# Patient Record
Sex: MALE | Race: WHITE | NOT HISPANIC OR LATINO | Employment: UNEMPLOYED | URBAN - METROPOLITAN AREA
[De-identification: names, ages, dates, MRNs, and addresses within clinical notes are randomized per-mention and may not be internally consistent; named-entity substitution may affect disease eponyms.]

---

## 2019-04-22 ENCOUNTER — APPOINTMENT (OUTPATIENT)
Dept: RADIOLOGY | Facility: CLINIC | Age: 39
End: 2019-04-22
Payer: COMMERCIAL

## 2019-04-22 ENCOUNTER — OFFICE VISIT (OUTPATIENT)
Dept: URGENT CARE | Facility: CLINIC | Age: 39
End: 2019-04-22
Payer: COMMERCIAL

## 2019-04-22 ENCOUNTER — TELEPHONE (OUTPATIENT)
Dept: URGENT CARE | Facility: CLINIC | Age: 39
End: 2019-04-22

## 2019-04-22 VITALS
SYSTOLIC BLOOD PRESSURE: 132 MMHG | BODY MASS INDEX: 36.16 KG/M2 | OXYGEN SATURATION: 96 % | TEMPERATURE: 98 F | WEIGHT: 225 LBS | RESPIRATION RATE: 20 BRPM | HEIGHT: 66 IN | DIASTOLIC BLOOD PRESSURE: 70 MMHG | HEART RATE: 92 BPM

## 2019-04-22 DIAGNOSIS — S63.91XA HAND SPRAIN, RIGHT, INITIAL ENCOUNTER: Primary | ICD-10-CM

## 2019-04-22 DIAGNOSIS — S69.91XA INJURY OF RIGHT HAND, INITIAL ENCOUNTER: ICD-10-CM

## 2019-04-22 PROCEDURE — 73130 X-RAY EXAM OF HAND: CPT

## 2019-04-22 PROCEDURE — G0382 LEV 3 HOSP TYPE B ED VISIT: HCPCS | Performed by: NURSE PRACTITIONER

## 2019-04-24 ENCOUNTER — OFFICE VISIT (OUTPATIENT)
Dept: OBGYN CLINIC | Facility: CLINIC | Age: 39
End: 2019-04-24
Payer: COMMERCIAL

## 2019-04-24 VITALS
SYSTOLIC BLOOD PRESSURE: 163 MMHG | DIASTOLIC BLOOD PRESSURE: 118 MMHG | WEIGHT: 225 LBS | HEIGHT: 66 IN | HEART RATE: 93 BPM | BODY MASS INDEX: 36.16 KG/M2

## 2019-04-24 DIAGNOSIS — S63.91XA HAND SPRAIN, RIGHT, INITIAL ENCOUNTER: ICD-10-CM

## 2019-04-24 DIAGNOSIS — S69.91XA INJURY OF RIGHT HAND, INITIAL ENCOUNTER: ICD-10-CM

## 2019-04-24 PROCEDURE — 99203 OFFICE O/P NEW LOW 30 MIN: CPT | Performed by: ORTHOPAEDIC SURGERY

## 2020-05-14 ENCOUNTER — APPOINTMENT (OUTPATIENT)
Dept: RADIOLOGY | Facility: CLINIC | Age: 40
End: 2020-05-14
Payer: COMMERCIAL

## 2020-05-14 ENCOUNTER — OFFICE VISIT (OUTPATIENT)
Dept: URGENT CARE | Facility: CLINIC | Age: 40
End: 2020-05-14
Payer: COMMERCIAL

## 2020-05-14 VITALS
OXYGEN SATURATION: 100 % | DIASTOLIC BLOOD PRESSURE: 81 MMHG | HEIGHT: 66 IN | HEART RATE: 97 BPM | TEMPERATURE: 97.6 F | WEIGHT: 180 LBS | RESPIRATION RATE: 18 BRPM | BODY MASS INDEX: 28.93 KG/M2 | SYSTOLIC BLOOD PRESSURE: 147 MMHG
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
## 2020-05-14 DIAGNOSIS — S20.211A CONTUSION OF RIB ON RIGHT SIDE, INITIAL ENCOUNTER: Primary | ICD-10-CM

## 2020-05-14 DIAGNOSIS — S93.601A SPRAIN OF RIGHT FOOT, INITIAL ENCOUNTER: ICD-10-CM

## 2020-05-14 DIAGNOSIS — S93.401A SPRAIN OF RIGHT ANKLE, UNSPECIFIED LIGAMENT, INITIAL ENCOUNTER: ICD-10-CM

## 2020-05-14 PROCEDURE — G0382 LEV 3 HOSP TYPE B ED VISIT: HCPCS | Performed by: PHYSICIAN ASSISTANT

## 2020-05-14 PROCEDURE — 71101 X-RAY EXAM UNILAT RIBS/CHEST: CPT

## 2020-05-14 PROCEDURE — 73630 X-RAY EXAM OF FOOT: CPT

## 2020-05-14 PROCEDURE — 96372 THER/PROPH/DIAG INJ SC/IM: CPT | Performed by: PHYSICIAN ASSISTANT

## 2020-05-14 PROCEDURE — 73610 X-RAY EXAM OF ANKLE: CPT

## 2020-05-14 RX ORDER — KETOROLAC TROMETHAMINE 30 MG/ML
30 INJECTION, SOLUTION INTRAMUSCULAR; INTRAVENOUS ONCE
Status: COMPLETED | OUTPATIENT
Start: 2020-05-14 | End: 2020-05-14

## 2020-05-14 RX ADMIN — KETOROLAC TROMETHAMINE 30 MG: 30 INJECTION, SOLUTION INTRAMUSCULAR; INTRAVENOUS at 14:46

## 2020-05-18 ENCOUNTER — OFFICE VISIT (OUTPATIENT)
Dept: OBGYN CLINIC | Facility: CLINIC | Age: 40
End: 2020-05-18
Payer: COMMERCIAL

## 2020-05-18 VITALS
DIASTOLIC BLOOD PRESSURE: 72 MMHG | HEIGHT: 66 IN | SYSTOLIC BLOOD PRESSURE: 118 MMHG | TEMPERATURE: 97.8 F | BODY MASS INDEX: 29.05 KG/M2

## 2020-05-18 DIAGNOSIS — S93.601A SPRAIN OF RIGHT FOOT, INITIAL ENCOUNTER: ICD-10-CM

## 2020-05-18 DIAGNOSIS — S93.401A SPRAIN OF RIGHT ANKLE, UNSPECIFIED LIGAMENT, INITIAL ENCOUNTER: Primary | ICD-10-CM
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
## 2020-05-18 PROCEDURE — 99213 OFFICE O/P EST LOW 20 MIN: CPT | Performed by: ORTHOPAEDIC SURGERY

## 2020-05-18 RX ORDER — IBUPROFEN 600 MG/1
TABLET ORAL EVERY 6 HOURS PRN
COMMUNITY

## 2020-05-26 ENCOUNTER — TELEPHONE (OUTPATIENT)
Dept: OBGYN CLINIC | Facility: MEDICAL CENTER | Age: 40
End: 2020-05-26

## 2020-06-18 ENCOUNTER — OFFICE VISIT (OUTPATIENT)
Dept: OBGYN CLINIC | Facility: CLINIC | Age: 40
End: 2020-06-18
Payer: COMMERCIAL

## 2020-06-18 VITALS
DIASTOLIC BLOOD PRESSURE: 82 MMHG | WEIGHT: 180 LBS | TEMPERATURE: 95.1 F | BODY MASS INDEX: 29.05 KG/M2 | SYSTOLIC BLOOD PRESSURE: 142 MMHG

## 2020-06-18 DIAGNOSIS — S93.401A SPRAIN OF RIGHT ANKLE, UNSPECIFIED LIGAMENT, INITIAL ENCOUNTER: Primary | ICD-10-CM

## 2020-06-18 PROCEDURE — 99213 OFFICE O/P EST LOW 20 MIN: CPT | Performed by: ORTHOPAEDIC SURGERY

## 2020-07-01 ENCOUNTER — TELEPHONE (OUTPATIENT)
Dept: OBGYN CLINIC | Facility: HOSPITAL | Age: 40
End: 2020-07-01

## 2020-07-01 NOTE — TELEPHONE ENCOUNTER
I called patient in regards to his FMLA forms  I could not leave a message because his mail box was full  We need an FMLA auth for the patient in order to complete his FMLA forms

## 2020-07-06 NOTE — TELEPHONE ENCOUNTER
I attempted to call patient again in regards to his FMLA forms      I could not leave a message because his mail box was full      We need an FMLA auth for the patient in order to complete his FMLA forms      If he calls back please let him know we need this in order to complete his disability forms

## 2020-07-15 NOTE — TELEPHONE ENCOUNTER
Left message for patient stating I need our FMLA Auth filled out before I can fax his FMLA Forms  I stated I can fax this or email this form to him      I gave him my number to call me back as soon as possible

## 2021-02-05 ENCOUNTER — OFFICE VISIT (OUTPATIENT)
Dept: URGENT CARE | Facility: CLINIC | Age: 41
End: 2021-02-05

## 2021-02-05 VITALS
DIASTOLIC BLOOD PRESSURE: 103 MMHG | HEIGHT: 67 IN | BODY MASS INDEX: 31.39 KG/M2 | WEIGHT: 200 LBS | TEMPERATURE: 98.5 F | RESPIRATION RATE: 18 BRPM | OXYGEN SATURATION: 99 % | HEART RATE: 106 BPM | SYSTOLIC BLOOD PRESSURE: 168 MMHG

## 2021-02-05 DIAGNOSIS — H10.33 ACUTE BACTERIAL CONJUNCTIVITIS OF BOTH EYES: Primary | ICD-10-CM

## 2021-02-05 PROCEDURE — 99213 OFFICE O/P EST LOW 20 MIN: CPT | Performed by: PHYSICIAN ASSISTANT

## 2021-02-05 RX ORDER — TOBRAMYCIN 3 MG/ML
2 SOLUTION/ DROPS OPHTHALMIC 4 TIMES DAILY
Qty: 1 BOTTLE | Refills: 0 | Status: SHIPPED | OUTPATIENT
Start: 2021-02-05 | End: 2021-02-10

## 2021-02-05 NOTE — PROGRESS NOTES
[facility]    NAME: Simi Coles is a 38 y o  male  : 1980    MRN: [id number]  DATE: 2021  TIME: 6:30 PM    Assessment and Plan   Acute bacterial conjunctivitis of both eyes [H10 33]  1  Acute bacterial conjunctivitis of both eyes  tobramycin (TOBREX) 0 3 % SOLN       Patient Instructions     Patient Instructions   Drops as directed  Wash hands frequently to prevent the spread of infection  Wash towels and sheets in hot water after one use  Chief Complaint     Chief Complaint   Patient presents with    Conjunctivitis     1 WEEK       History of Present Illness     16-year-old male here with complaint of reddened eyes for the last week  States initially thought that he had something in his 1 eye and had been rubbing it  Started discharge from the 1 eye and now has discharge and redness in both of his  Denies any visual changes  Denies any upper respiratory complaints or cold symptoms  Review of Systems   Review of Systems   Constitutional: Negative for chills, fatigue and fever  HENT: Negative for congestion, ear pain, postnasal drip, sinus pressure and sore throat  Eyes: Positive for discharge and redness  Respiratory: Negative for cough, shortness of breath and wheezing  Neurological: Negative for headaches  All other systems reviewed and are negative        Current Medications     Current Outpatient Medications:     ibuprofen (MOTRIN) 600 mg tablet, Take by mouth every 6 (six) hours as needed for mild pain, Disp: , Rfl:     tobramycin (TOBREX) 0 3 % SOLN, Administer 2 drops to both eyes 4 (four) times a day for 5 days, Disp: 1 Bottle, Rfl: 0    Current Allergies     Allergies as of 2021    (No Known Allergies)          The following portions of the patient's history were reviewed and updated as appropriate: allergies, current medications, past family history, past medical history, past social history, past surgical history and problem list  History reviewed  No pertinent past medical history  History reviewed  No pertinent surgical history  History reviewed  No pertinent family history  Social History     Socioeconomic History    Marital status: Single     Spouse name: Not on file    Number of children: Not on file    Years of education: Not on file    Highest education level: Not on file   Occupational History    Not on file   Social Needs    Financial resource strain: Not on file    Food insecurity     Worry: Not on file     Inability: Not on file    Transportation needs     Medical: Not on file     Non-medical: Not on file   Tobacco Use    Smoking status: Current Every Day Smoker    Smokeless tobacco: Never Used   Substance and Sexual Activity    Alcohol use: Not on file    Drug use: Not on file    Sexual activity: Not on file   Lifestyle    Physical activity     Days per week: Not on file     Minutes per session: Not on file    Stress: Not on file   Relationships    Social connections     Talks on phone: Not on file     Gets together: Not on file     Attends Restorationist service: Not on file     Active member of club or organization: Not on file     Attends meetings of clubs or organizations: Not on file     Relationship status: Not on file    Intimate partner violence     Fear of current or ex partner: Not on file     Emotionally abused: Not on file     Physically abused: Not on file     Forced sexual activity: Not on file   Other Topics Concern    Not on file   Social History Narrative    Not on file     Medications have been verified  Objective   BP (!) 168/103   Pulse (!) 106   Temp 98 5 °F (36 9 °C)   Resp 18   Ht 5' 7" (1 702 m)   Wt 90 7 kg (200 lb)   SpO2 99%   BMI 31 32 kg/m²      Physical Exam   Physical Exam  Vitals signs and nursing note reviewed  Constitutional:       General: He is not in acute distress  Appearance: He is well-developed  HENT:      Head: Normocephalic and atraumatic        Right Ear: Tympanic membrane normal       Left Ear: Tympanic membrane normal       Nose: Nose normal  No mucosal edema  Mouth/Throat:      Pharynx: No posterior oropharyngeal erythema  Eyes:      General:         Right eye: Discharge present  Left eye: Discharge present  Conjunctiva/sclera:      Right eye: Right conjunctiva is injected  Left eye: Left conjunctiva is injected  Neck:      Musculoskeletal: Normal range of motion  Cardiovascular:      Rate and Rhythm: Normal rate and regular rhythm  Heart sounds: Normal heart sounds  Pulmonary:      Effort: Pulmonary effort is normal  No respiratory distress  Breath sounds: Normal breath sounds

## 2021-02-05 NOTE — PATIENT INSTRUCTIONS
Drops as directed  Wash hands frequently to prevent the spread of infection  Wash towels and sheets in hot water after one use

## 2022-01-25 ENCOUNTER — OFFICE VISIT (OUTPATIENT)
Dept: URGENT CARE | Facility: CLINIC | Age: 42
End: 2022-01-25

## 2022-01-25 VITALS
TEMPERATURE: 98 F | HEART RATE: 97 BPM | RESPIRATION RATE: 18 BRPM | OXYGEN SATURATION: 98 % | WEIGHT: 200 LBS | BODY MASS INDEX: 31.32 KG/M2

## 2022-01-25 DIAGNOSIS — Z20.822 CLOSE EXPOSURE TO COVID-19 VIRUS: Primary | ICD-10-CM

## 2022-01-25 DIAGNOSIS — R05.9 COUGH: ICD-10-CM

## 2022-01-25 PROCEDURE — 99213 OFFICE O/P EST LOW 20 MIN: CPT | Performed by: PHYSICIAN ASSISTANT

## 2022-01-25 PROCEDURE — 87636 SARSCOV2 & INF A&B AMP PRB: CPT | Performed by: PHYSICIAN ASSISTANT

## 2022-01-25 NOTE — PROGRESS NOTES
[facility]        NAME: Kailey Jo is a 42 y o  male  : 1980    MRN: [id number]  DATE: 2022  TIME: 4:49 PM    Assessment and Plan   Close exposure to COVID-19 virus [Z20 822]  1  Close exposure to COVID-19 virus  Covid/Flu-Office Collect   2  Cough           Patient Instructions   Patient Instructions   If You Test Positive for COVID-19 (Isolate)  Everyone, regardless of vaccination status  Stay home for 5 days  If you have no symptoms or your symptoms are resolving after 5 days, you can leave your house  Continue to wear a mask around others for 5 additional days  If you have a fever, continue to stay home until your fever resolves  If You Were Exposed to Someone with COVID-19 (Quarantine)  If you:    Have been boosted  OR  Completed the primary series of Pfizer or Moderna vaccine within the last 6 months  OR  Completed the primary series of J&J vaccine within the last 2 months    Wear a mask around others for 10 days  Test on day 5, if possible  If you develop symptoms get a test and stay home  If you:    Completed the primary series of Pfizer or Moderna vaccine over 6 months ago and are not boosted  OR  Completed the primary series of J&J over 2 months ago and are not boosted  OR  Are unvaccinated    Stay home for 5 days  After that continue to wear a mask around others for 5 additional days  If you cant quarantine you must wear a mask for 10 days  Test on day 5 if possible  If you develop symptoms get a test and stay home        Follow up with PCP in 3-5 days  Proceed to  ER if symptoms worsen      Chief Complaint     Chief Complaint   Patient presents with    Flu Symptoms     x 1 day         History of Present Illness       -The patient c/o fatigue, ST, non-productive cough x 1 day  -He also c/o feeling feverish  -He states girlfriend tested positive for covid 2 days ago  -He is not vaccinated against covid      Review of Systems   Review of Systems Constitutional: Positive for chills and fever  HENT: Positive for congestion and sore throat  Negative for ear pain, rhinorrhea and sinus pressure  Eyes: Negative for pain and visual disturbance  Respiratory: Positive for cough  Negative for shortness of breath and wheezing  Cardiovascular: Negative for chest pain and palpitations  Gastrointestinal: Negative for abdominal pain, diarrhea and vomiting  Genitourinary: Negative for dysuria and hematuria  Musculoskeletal: Positive for myalgias  Negative for arthralgias and back pain  Skin: Negative for color change and rash  Neurological: Positive for dizziness and headaches  Negative for seizures and syncope  All other systems reviewed and are negative  Current Medications       Current Outpatient Medications:     ibuprofen (MOTRIN) 600 mg tablet, Take by mouth every 6 (six) hours as needed for mild pain (Patient not taking: Reported on 1/25/2022 ), Disp: , Rfl:     Current Allergies     Allergies as of 01/25/2022    (No Known Allergies)            The following portions of the patient's history were reviewed and updated as appropriate: allergies, current medications, past family history, past medical history, past social history, past surgical history and problem list      History reviewed  No pertinent past medical history  History reviewed  No pertinent surgical history  History reviewed  No pertinent family history  Medications have been verified  Objective   Pulse 97   Temp 98 °F (36 7 °C)   Resp 18   Wt 90 7 kg (200 lb)   SpO2 98%   BMI 31 32 kg/m²        Physical Exam     Physical Exam  Constitutional:       Appearance: He is well-developed  HENT:      Head: Normocephalic  Right Ear: Tympanic membrane normal       Left Ear: Tympanic membrane normal       Nose: Congestion and rhinorrhea present  Mouth/Throat:      Pharynx: Posterior oropharyngeal erythema present     Eyes:      General: Left eye: No discharge  Pupils: Pupils are equal, round, and reactive to light  Neck:      Thyroid: No thyromegaly  Trachea: No tracheal deviation  Cardiovascular:      Rate and Rhythm: Normal rate and regular rhythm  Heart sounds: No murmur heard  Pulmonary:      Effort: Pulmonary effort is normal  No respiratory distress  Breath sounds: Rhonchi present  No wheezing or rales  Chest:      Chest wall: No tenderness  Abdominal:      General: Bowel sounds are normal  There is no distension  Palpations: Abdomen is soft  There is no mass  Tenderness: There is no abdominal tenderness  There is no guarding or rebound  Musculoskeletal:         General: Normal range of motion  Cervical back: Normal range of motion  Lymphadenopathy:      Cervical: Cervical adenopathy present  Right cervical: Superficial cervical adenopathy present  Left cervical: Superficial cervical adenopathy present  Skin:     General: Skin is warm  Neurological:      Mental Status: He is alert  -COVID swab was sent to the lab  The patient was instructed to self isolate as per CDC guidelines  He was instructed to go to the ER if symptoms worsen

## 2022-01-26 LAB
FLUAV RNA RESP QL NAA+PROBE: NEGATIVE
FLUBV RNA RESP QL NAA+PROBE: NEGATIVE
SARS-COV-2 RNA RESP QL NAA+PROBE: NEGATIVE

## 2022-02-05 ENCOUNTER — HOSPITAL ENCOUNTER (EMERGENCY)
Facility: HOSPITAL | Age: 42
Discharge: HOME | End: 2022-02-06
Attending: EMERGENCY MEDICINE
Payer: OTHER GOVERNMENT

## 2022-02-05 VITALS
HEART RATE: 96 BPM | SYSTOLIC BLOOD PRESSURE: 166 MMHG | WEIGHT: 196.21 LBS | TEMPERATURE: 97.7 F | OXYGEN SATURATION: 99 % | BODY MASS INDEX: 30.73 KG/M2 | RESPIRATION RATE: 16 BRPM | DIASTOLIC BLOOD PRESSURE: 90 MMHG

## 2022-02-05 DIAGNOSIS — S49.91XA INJURY OF RIGHT SHOULDER, INITIAL ENCOUNTER: Primary | ICD-10-CM

## 2022-02-05 PROCEDURE — 99283 EMERGENCY DEPT VISIT LOW MDM: CPT

## 2022-02-06 ENCOUNTER — APPOINTMENT (EMERGENCY)
Dept: RADIOLOGY | Facility: HOSPITAL | Age: 42
End: 2022-02-06

## 2022-02-06 PROCEDURE — 99284 EMERGENCY DEPT VISIT MOD MDM: CPT | Performed by: EMERGENCY MEDICINE

## 2022-02-06 PROCEDURE — 73030 X-RAY EXAM OF SHOULDER: CPT

## 2022-02-06 NOTE — ED PROVIDER NOTES
History  Chief Complaint   Patient presents with    Shoulder Injury     Patient is a 41-year-old male presents for evaluation of right shoulder injury  Patient says he slipped on the ice earlier tonight landing on his right shoulder  He did not hit his head or lose consciousness  He is not on any blood thinners  The patient is able to move his right shoulder those it causes him some discomfort  He said initially he had some paresthesias in the right arm but they have improved  No obvious deformity on exam          Prior to Admission Medications   Prescriptions Last Dose Informant Patient Reported? Taking?   ibuprofen (MOTRIN) 600 mg tablet  Self Yes No   Sig: Take by mouth every 6 (six) hours as needed for mild pain   Patient not taking: Reported on 1/25/2022       Facility-Administered Medications: None       No past medical history on file  No past surgical history on file  No family history on file  I have reviewed and agree with the history as documented  E-Cigarette/Vaping    E-Cigarette Use Never User      E-Cigarette/Vaping Substances     Social History     Tobacco Use    Smoking status: Current Every Day Smoker     Packs/day: 0 50    Smokeless tobacco: Never Used   Vaping Use    Vaping Use: Never used   Substance Use Topics    Alcohol use: Never    Drug use: Never       Review of Systems   Constitutional: Negative for chills, fever and unexpected weight change  HENT: Negative for congestion, sore throat and trouble swallowing  Eyes: Negative for pain, discharge and itching  Respiratory: Negative for cough, chest tightness, shortness of breath and wheezing  Cardiovascular: Negative for chest pain, palpitations and leg swelling  Gastrointestinal: Negative for abdominal pain, blood in stool, diarrhea, nausea and vomiting  Endocrine: Negative for polyuria  Genitourinary: Negative for difficulty urinating, dysuria, frequency and hematuria     Musculoskeletal: Positive for arthralgias (R shoulder)  Negative for back pain and joint swelling  Neurological: Negative for dizziness, syncope, weakness, light-headedness and headaches  Physical Exam  Physical Exam  Vitals and nursing note reviewed  Constitutional:       General: He is not in acute distress  Appearance: He is well-developed  HENT:      Head: Normocephalic and atraumatic  Right Ear: External ear normal       Left Ear: External ear normal    Eyes:      Conjunctiva/sclera: Conjunctivae normal       Pupils: Pupils are equal, round, and reactive to light  Cardiovascular:      Rate and Rhythm: Normal rate and regular rhythm  Heart sounds: Normal heart sounds  No murmur heard  No friction rub  No gallop  Pulmonary:      Effort: Pulmonary effort is normal  No respiratory distress  Breath sounds: Normal breath sounds  No wheezing or rales  Abdominal:      General: Bowel sounds are normal  There is no distension  Palpations: Abdomen is soft  Tenderness: There is no abdominal tenderness  There is no guarding  Musculoskeletal:         General: Tenderness (R shoulder) and signs of injury present  No swelling or deformity  Normal range of motion  Cervical back: Normal range of motion  Lymphadenopathy:      Cervical: No cervical adenopathy  Skin:     General: Skin is warm and dry  Neurological:      General: No focal deficit present  Mental Status: He is alert and oriented to person, place, and time  Mental status is at baseline  Cranial Nerves: No cranial nerve deficit  Sensory: No sensory deficit  Motor: No weakness or abnormal muscle tone     Psychiatric:         Behavior: Behavior normal          Vital Signs  ED Triage Vitals [02/05/22 2350]   Temperature Pulse Respirations Blood Pressure SpO2   97 7 °F (36 5 °C) 96 16 166/90 99 %      Temp src Heart Rate Source Patient Position - Orthostatic VS BP Location FiO2 (%)   -- -- -- -- --      Pain Score       6 Vitals:    02/05/22 2350   BP: 166/90   Pulse: 96         Visual Acuity      ED Medications  Medications - No data to display    Diagnostic Studies  Results Reviewed     None                 XR shoulder 2+ views RIGHT   ED Interpretation by Roxanne Watkins DO (02/06 0038)   No acute osseous abnormality                  Procedures  Procedures         ED Course  ED Course as of 02/06/22 0057   Sun Feb 06, 2022   9269 Patient with point tenderness in the area where there is questionable abnormality on the right shoulder x-ray  I told the patient that the treatment for potential fracture was shoulder immobilization and ortho follow-up which I am going to do  SBIRT 22yo+      Most Recent Value   SBIRT (24 yo +)    In order to provide better care to our patients, we are screening all of our patients for alcohol and drug use  Would it be okay to ask you these screening questions? Yes Filed at: 02/06/2022 0015   Initial Alcohol Screen: US AUDIT-C     1  How often do you have a drink containing alcohol? 0  [Simultaneous filing  User may not have seen previous data ] Filed at: 02/06/2022 0015   2  How many drinks containing alcohol do you have on a typical day you are drinking?  0  [Simultaneous filing  User may not have seen previous data ] Filed at: 02/06/2022 0015   3a  Male UNDER 65: How often do you have five or more drinks on one occasion? 0  [Simultaneous filing  User may not have seen previous data ] Filed at: 02/06/2022 0015   3b  FEMALE Any Age, or MALE 65+: How often do you have 4 or more drinks on one occassion? 0  [Simultaneous filing  User may not have seen previous data ] Filed at: 02/06/2022 0015   Audit-C Score 0  [Simultaneous filing  User may not have seen previous data ] Filed at: 02/06/2022 0015   HETAL: How many times in the past year have you    Used an illegal drug or used a prescription medication for non-medical reasons? Never  [Simultaneous filing   User may not have seen previous data ] Filed at: 02/06/2022 0015                    [facility]  Number of Diagnoses or Management Options  Diagnosis management comments: 43-year-old male presenting for right shoulder pain  Fell on the [place]  Denies hit head or lose conscious  No obvious deformity to the right shoulder  Does have some tenderness on exam   Arm is neurovascularly intact  Will obtain x-ray of right shoulder  Patient declined pain management in the ED      Disposition  Final diagnoses:   Injury of right shoulder, initial encounter     Time reflects when diagnosis was documented in both MDM as applicable and the Disposition within this note     Time User Action Codes Description Comment    2/6/2022 12:39 AM Jensen Lorenzo Add [S49 [id number]] Injury of right shoulder, initial encounter       ED Disposition     ED Disposition Condition Date/Time Comment    Discharge Stable Sun Feb 6, 2022 12:39 AM Hira Gill discharge to home/self care              Follow-up Information     Follow up With Specialties Details Why Contact Info Additional Information    [address] [facility] card Orthopedic Surgery Schedule an appointment as soon as possible for a visit  For follow up of R shoulder pain [address] [facility] card, [address] [place] [address]          Discharge Medication List as of 2/6/2022 12:40 AM      CONTINUE these medications which have NOT CHANGED    Details   ibuprofen (MOTRIN) 600 mg tablet Take by mouth every 6 (six) hours as needed for mild pain, Historical Med                 PDMP Review     None          ED Provider  Electronically Signed by           Cassius Mills DO  02/06/22 6523

## 2022-04-15 ENCOUNTER — HOSPITAL ENCOUNTER (EMERGENCY)
Facility: HOSPITAL | Age: 42
Discharge: HOME | End: 2022-04-16
Attending: EMERGENCY MEDICINE
Payer: OTHER GOVERNMENT

## 2022-04-15 ENCOUNTER — APPOINTMENT (EMERGENCY)
Dept: RADIOLOGY | Facility: HOSPITAL | Age: 42
End: 2022-04-15
Payer: OTHER GOVERNMENT

## 2022-04-15 DIAGNOSIS — R11.2 NAUSEA AND VOMITING: ICD-10-CM

## 2022-04-15 DIAGNOSIS — R07.89 ATYPICAL CHEST PAIN: Primary | ICD-10-CM

## 2022-04-15 LAB
ALBUMIN SERPL BCP-MCNC: 4.1 G/DL (ref 3.5–5)
ALP SERPL-CCNC: 109 U/L (ref 46–116)
ALT SERPL W P-5'-P-CCNC: 40 U/L (ref 12–78)
ANION GAP SERPL CALCULATED.3IONS-SCNC: 7 MMOL/L (ref 4–13)
APTT PPP: 29 SECONDS (ref 23–37)
AST SERPL W P-5'-P-CCNC: 13 U/L (ref 5–45)
BASOPHILS # BLD AUTO: 0.1 THOUSANDS/ΜL (ref 0–0.1)
BASOPHILS NFR BLD AUTO: 1 % (ref 0–1)
BILIRUB SERPL-MCNC: 0.33 MG/DL (ref 0.2–1)
BUN SERPL-MCNC: 16 MG/DL (ref 5–25)
CALCIUM SERPL-MCNC: 9.2 MG/DL (ref 8.3–10.1)
CARDIAC TROPONIN I PNL SERPL HS: 3 NG/L
CHLORIDE SERPL-SCNC: 103 MMOL/L (ref 100–108)
CO2 SERPL-SCNC: 27 MMOL/L (ref 21–32)
CREAT SERPL-MCNC: 1.1 MG/DL (ref 0.6–1.3)
EOSINOPHIL # BLD AUTO: 0.33 THOUSAND/ΜL (ref 0–0.61)
EOSINOPHIL NFR BLD AUTO: 4 % (ref 0–6)
ERYTHROCYTE [DISTWIDTH] IN BLOOD BY AUTOMATED COUNT: 11.5 % (ref 11.6–15.1)
GFR SERPL CREATININE-BSD FRML MDRD: 82 ML/MIN/1.73SQ M
GLUCOSE SERPL-MCNC: 90 MG/DL (ref 65–140)
HCT VFR BLD AUTO: 46.1 % (ref 36.5–49.3)
HGB BLD-MCNC: 16.2 G/DL (ref 12–17)
IMM GRANULOCYTES # BLD AUTO: 0.04 THOUSAND/UL (ref 0–0.2)
IMM GRANULOCYTES NFR BLD AUTO: 0 % (ref 0–2)
INR PPP: 0.95 (ref 0.84–1.19)
LIPASE SERPL-CCNC: 145 U/L (ref 73–393)
LYMPHOCYTES # BLD AUTO: 3.16 THOUSANDS/ΜL (ref 0.6–4.47)
LYMPHOCYTES NFR BLD AUTO: 34 % (ref 14–44)
MCH RBC QN AUTO: 30.7 PG (ref 26.8–34.3)
MCHC RBC AUTO-ENTMCNC: 35.1 G/DL (ref 31.4–37.4)
MCV RBC AUTO: 87 FL (ref 82–98)
MONOCYTES # BLD AUTO: 0.93 THOUSAND/ΜL (ref 0.17–1.22)
MONOCYTES NFR BLD AUTO: 10 % (ref 4–12)
NEUTROPHILS # BLD AUTO: 4.65 THOUSANDS/ΜL (ref 1.85–7.62)
NEUTS SEG NFR BLD AUTO: 51 % (ref 43–75)
NRBC BLD AUTO-RTO: 0 /100 WBCS
PLATELET # BLD AUTO: 308 THOUSANDS/UL (ref 149–390)
PMV BLD AUTO: 9 FL (ref 8.9–12.7)
POTASSIUM SERPL-SCNC: 4.5 MMOL/L (ref 3.5–5.3)
PROT SERPL-MCNC: 7.9 G/DL (ref 6.4–8.2)
PROTHROMBIN TIME: 12.3 SECONDS (ref 11.6–14.5)
RBC # BLD AUTO: 5.28 MILLION/UL (ref 3.88–5.62)
SODIUM SERPL-SCNC: 137 MMOL/L (ref 136–145)
WBC # BLD AUTO: 9.21 THOUSAND/UL (ref 4.31–10.16)

## 2022-04-15 PROCEDURE — 85730 THROMBOPLASTIN TIME PARTIAL: CPT | Performed by: EMERGENCY MEDICINE

## 2022-04-15 PROCEDURE — 71045 X-RAY EXAM CHEST 1 VIEW: CPT

## 2022-04-15 PROCEDURE — 85025 COMPLETE CBC W/AUTO DIFF WBC: CPT | Performed by: EMERGENCY MEDICINE

## 2022-04-15 PROCEDURE — 80053 COMPREHEN METABOLIC PANEL: CPT | Performed by: EMERGENCY MEDICINE

## 2022-04-15 PROCEDURE — 36415 COLL VENOUS BLD VENIPUNCTURE: CPT | Performed by: EMERGENCY MEDICINE

## 2022-04-15 PROCEDURE — 85610 PROTHROMBIN TIME: CPT | Performed by: EMERGENCY MEDICINE

## 2022-04-15 PROCEDURE — 93005 ELECTROCARDIOGRAM TRACING: CPT

## 2022-04-15 PROCEDURE — 99285 EMERGENCY DEPT VISIT HI MDM: CPT | Performed by: EMERGENCY MEDICINE

## 2022-04-15 PROCEDURE — 96374 THER/PROPH/DIAG INJ IV PUSH: CPT

## 2022-04-15 PROCEDURE — 99285 EMERGENCY DEPT VISIT HI MDM: CPT

## 2022-04-15 PROCEDURE — 84484 ASSAY OF TROPONIN QUANT: CPT | Performed by: EMERGENCY MEDICINE

## 2022-04-15 PROCEDURE — 96361 HYDRATE IV INFUSION ADD-ON: CPT

## 2022-04-15 PROCEDURE — 83690 ASSAY OF LIPASE: CPT | Performed by: EMERGENCY MEDICINE

## 2022-04-15 RX ORDER — ONDANSETRON 2 MG/ML
4 INJECTION INTRAMUSCULAR; INTRAVENOUS ONCE
Status: COMPLETED | OUTPATIENT
Start: 2022-04-15 | End: 2022-04-15

## 2022-04-15 RX ADMIN — ONDANSETRON 4 MG: 2 INJECTION INTRAMUSCULAR; INTRAVENOUS at 23:09

## 2022-04-15 RX ADMIN — SODIUM CHLORIDE 1000 ML: 0.9 INJECTION, SOLUTION INTRAVENOUS at 23:10

## 2022-04-16 VITALS
WEIGHT: 200 LBS | DIASTOLIC BLOOD PRESSURE: 104 MMHG | HEART RATE: 93 BPM | TEMPERATURE: 99.2 F | OXYGEN SATURATION: 97 % | RESPIRATION RATE: 18 BRPM | SYSTOLIC BLOOD PRESSURE: 158 MMHG | BODY MASS INDEX: 31.32 KG/M2

## 2022-04-16 LAB — CARDIAC TROPONIN I PNL SERPL HS: 3 NG/L (ref 8–18)

## 2022-04-16 PROCEDURE — 96361 HYDRATE IV INFUSION ADD-ON: CPT

## 2022-04-16 PROCEDURE — 84484 ASSAY OF TROPONIN QUANT: CPT | Performed by: EMERGENCY MEDICINE

## 2022-04-16 PROCEDURE — 93005 ELECTROCARDIOGRAM TRACING: CPT

## 2022-04-16 PROCEDURE — 36415 COLL VENOUS BLD VENIPUNCTURE: CPT | Performed by: EMERGENCY MEDICINE

## 2022-04-16 RX ORDER — FAMOTIDINE 20 MG/1
20 TABLET, FILM COATED ORAL 2 TIMES DAILY
Qty: 30 TABLET | Refills: 0 | Status: SHIPPED | OUTPATIENT
Start: 2022-04-16 | End: 2022-05-01

## 2022-04-16 RX ORDER — ONDANSETRON 4 MG/1
4 TABLET, ORALLY DISINTEGRATING ORAL EVERY 6 HOURS PRN
Qty: 20 TABLET | Refills: 0 | Status: SHIPPED | OUTPATIENT
Start: 2022-04-16

## 2022-04-16 NOTE — ED PROVIDER NOTES
History  Chief Complaint   Patient presents with    Chest Pain     Patient reports tightness in chest/neck starting aprx 9pm tonight  Also c/o coughing up blood  Denies cardiac history  +n/v  This is a 54-year-old male with a history of hypertension, methamphetamine abuse who presents with chest tightness and vomiting  [facility]  At around 9:00 p m  This evening, the patient started to experience a chest and neck tightness  Symptoms were associated with 1 episode of vomit with streaks of blood  He was also dry heaving  Symptoms also associated with diaphoresis  Patient states that, in the past, he has experienced episodes of chest tightness with diaphoresis and vomiting  States that he has never experienced streaks of blood in the vomit  Denies any history of gastric ulcers or alcohol abuse  Denies any NSAID use  Patient last used methamphetamine approximately 1 month ago  Denies any sick contacts  Currently, patient feels nauseous  Denies history of hyperlipidemia, diabetes, obesity, tobacco use (within last 3 months), family history of CAD (before age 72), personal history of MI, PAD, CVA  Prior to Admission Medications   Prescriptions Last Dose Informant Patient Reported? Taking?   ibuprofen (MOTRIN) 600 mg tablet Not Taking at Unknown time Self Yes No   Sig: Take by mouth every 6 (six) hours as needed for mild pain   Patient not taking: Reported on 1/25/2022       Facility-Administered Medications: None       History reviewed  No pertinent past medical history  History reviewed  No pertinent surgical history  History reviewed  No pertinent family history  I have reviewed and agree with the history as documented      E-Cigarette/Vaping    E-Cigarette Use Never User      E-Cigarette/Vaping Substances     Social History     Tobacco Use    Smoking status: Former Smoker     Packs/day: 0 50    Smokeless tobacco: Never Used   Vaping Use    Vaping Use: Never used   Substance Use Topics    Alcohol use: Never    Drug use: Never       Review of Systems   Constitutional: Positive for diaphoresis  Negative for chills, fatigue and fever  HENT: Negative for rhinorrhea, sore throat and trouble swallowing  Eyes: Negative for photophobia and visual disturbance  Respiratory: Negative for cough, chest tightness and shortness of breath  Cardiovascular: Positive for chest pain  Negative for palpitations and leg swelling  Gastrointestinal: Positive for nausea and vomiting  Negative for abdominal pain, blood in stool and diarrhea  Endocrine: Negative for polyuria  Genitourinary: Negative for dysuria, flank pain and hematuria  Musculoskeletal: Negative for back pain and neck pain  Skin: Negative for color change and rash  Allergic/Immunologic: Negative for immunocompromised state  Neurological: Negative for dizziness, weakness, light-headedness, numbness and headaches  All other systems reviewed and are negative  Physical Exam  Physical Exam  Constitutional:       General: He is not in acute distress  Appearance: Normal appearance  He is well-developed  HENT:      Mouth/Throat:      Pharynx: Oropharynx is clear  Uvula midline  No pharyngeal swelling, oropharyngeal exudate, posterior oropharyngeal erythema or uvula swelling  Tonsils: No tonsillar exudate or tonsillar abscesses  Eyes:      General: Lids are normal       Conjunctiva/sclera: Conjunctivae normal       Pupils: Pupils are equal, round, and reactive to light  Neck:      Thyroid: No thyroid mass or thyromegaly  Trachea: Trachea normal    Cardiovascular:      Rate and Rhythm: Normal rate and regular rhythm  Heart sounds: Normal heart sounds  No murmur heard  Pulmonary:      Effort: Pulmonary effort is normal       Breath sounds: Normal breath sounds  Abdominal:      General: Bowel sounds are normal       Palpations: Abdomen is soft  Tenderness:  There is no abdominal tenderness  There is no guarding or rebound  Negative signs include Cash's sign  Skin:     General: Skin is warm and dry  Neurological:      Mental Status: He is alert  Psychiatric:         Speech: Speech normal          Behavior: Behavior normal  Behavior is cooperative  Thought Content:  Thought content normal          Vital Signs  ED Triage Vitals   Temperature Pulse Respirations Blood Pressure SpO2   04/15/22 2254 04/15/22 2256 04/15/22 2256 04/15/22 2256 04/15/22 2256   99 2 °F (37 3 °C) 96 20 (!) 207/113 98 %      Temp Source Heart Rate Source Patient Position - Orthostatic VS BP Location FiO2 (%)   04/15/22 2254 04/15/22 2254 04/15/22 2256 04/15/22 2256 --   Temporal Monitor Lying Left arm       Pain Score       --                  Vitals:    04/16/22 0000 04/16/22 0030 04/16/22 0100 04/16/22 0130   BP: (!) 166/109 (!) 150/109 152/91 (!) 158/104   Pulse: 84 90 88 93   Patient Position - Orthostatic VS:  Sitting  Lying         Visual Acuity      ED Medications  Medications   ondansetron (ZOFRAN) injection 4 mg (4 mg Intravenous Given 4/15/22 2309)   sodium chloride 0 9 % bolus 1,000 mL (1,000 mL Intravenous New Bag 4/15/22 2310)       Diagnostic Studies  Results Reviewed     Procedure Component Value Units Date/Time    High Sensitivity Troponin I Random [[id number]]  (Abnormal) Collected: 04/16/22 0114    Lab Status: Final result Specimen: Blood from Arm, Left Updated: 04/16/22 0140     HS TnI random 3 ng/L     HS Troponin 0hr (reflex protocol) [[id number]]  (Normal) Collected: 04/15/22 2307    Lab Status: Final result Specimen: Blood from Arm, Left Updated: 04/15/22 2342     hs TnI 0hr 3 ng/L     Comprehensive metabolic panel [[id number]] Collected: 04/15/22 2307    Lab Status: Final result Specimen: Blood from Arm, Left Updated: 04/15/22 2340     Sodium 137 mmol/L      Potassium 4 5 mmol/L      Chloride 103 mmol/L      CO2 27 mmol/L      ANION GAP 7 mmol/L      BUN 16 mg/dL Creatinine 1 10 mg/dL      Glucose 90 mg/dL      Calcium 9 2 mg/dL      AST 13 U/L      ALT 40 U/L      Alkaline Phosphatase 109 U/L      Total Protein 7 9 g/dL      Albumin 4 1 g/dL      Total Bilirubin 0 33 mg/dL      eGFR 82 ml/min/1 73sq m     Narrative:      National Kidney Disease Foundation guidelines for Chronic Kidney Disease (CKD):     Stage 1 with normal or high GFR (GFR > 90 mL/min/1 73 square meters)    Stage 2 Mild CKD (GFR = 60-89 mL/min/1 73 square meters)    Stage 3A Moderate CKD (GFR = 45-59 mL/min/1 73 square meters)    Stage 3B Moderate CKD (GFR = 30-44 mL/min/1 73 square meters)    Stage 4 Severe CKD (GFR = 15-29 mL/min/1 73 square meters)    Stage 5 End Stage CKD (GFR <15 mL/min/1 73 square meters)  Note: GFR calculation is accurate only with a steady state creatinine    Lipase [[id number]]  (Normal) Collected: 04/15/22 2307    Lab Status: Final result Specimen: Blood from Arm, Left Updated: 04/15/22 2340     Lipase 145 u/L     Protime-INR [[id number]]  (Normal) Collected: 04/15/22 2307    Lab Status: Final result Specimen: Blood from Arm, Left Updated: 04/15/22 2333     Protime 12 3 seconds      INR 0 95    APTT [[id number]]  (Normal) Collected: 04/15/22 2307    Lab Status: Final result Specimen: Blood from Arm, Left Updated: 04/15/22 2333     PTT 29 seconds     CBC and differential [[id number]]  (Abnormal) Collected: 04/15/22 2307    Lab Status: Final result Specimen: Blood from Arm, Left Updated: 04/15/22 2320     WBC 9 21 Thousand/uL      RBC 5 28 Million/uL      Hemoglobin 16 2 g/dL      Hematocrit 46 1 %      MCV 87 fL      MCH 30 7 pg      MCHC 35 1 g/dL      RDW 11 5 %      MPV 9 0 fL      Platelets 522 Thousands/uL      nRBC 0 /100 WBCs      Neutrophils Relative 51 %      Immat GRANS % 0 %      Lymphocytes Relative 34 %      Monocytes Relative 10 %      Eosinophils Relative 4 %      Basophils Relative 1 %      Neutrophils Absolute 4 65 Thousands/µL      Immature Grans Absolute 0 04 Thousand/uL      Lymphocytes Absolute 3 16 Thousands/µL      Monocytes Absolute 0 93 Thousand/µL      Eosinophils Absolute 0 33 Thousand/µL      Basophils Absolute 0 10 Thousands/µL                  XR chest 1 view portable   ED Interpretation by Rashida López MD (04/16 0122)   No acute cardiopulmonary disease as interpreted by myself  Procedures  ECG 12 Lead Documentation Only    Date/Time: 4/15/2022 11:08 PM  Performed by: Rashida López MD  Authorized by: Rashida López MD     ECG reviewed by me, the ED Provider: yes    Patient location:  ED  Previous ECG:     Previous ECG:  Unavailable    Comparison to cardiac monitor: Yes    Interpretation:     Interpretation: normal    Rate:     ECG rate:  87    ECG rate assessment: normal    Rhythm:     Rhythm: sinus rhythm    Ectopy:     Ectopy: none    QRS:     QRS axis:  Normal    QRS intervals:  Normal  Conduction:     Conduction: normal    ST segments:     ST segments:  Normal  T waves:     T waves: normal               ED Course  ED Course as of 04/16/22 0146   Sat Apr 16, 2022   0019 hs TnI 0hr: 3  Will perform delta   0123 Repeat EKG reveals normal sinus rhythm at 88 beats per minute  Normal axis  Normal QRS and QT intervals  No ST elevation or depression  Unremarkable T-waves  EKG is unchanged  EKG interpreted by myself  HEART Risk Score      Most Recent Value   Heart Score Risk Calculator    History 0 Filed at: 04/16/2022 0145   ECG 0 Filed at: 04/16/2022 0145   Age 0 Filed at: 04/16/2022 0145   Risk Factors 1 Filed at: 04/16/2022 0145   Troponin 0 Filed at: 04/16/2022 0145   HEART Score 1 Filed at: 04/16/2022 0145                        SBIRT 22yo+      Most Recent Value   SBIRT (25 yo +)    In order to provide better care to our patients, we are screening all of our patients for alcohol and drug use  Would it be okay to ask you these screening questions?  Unable to answer at this time Filed at: 04/15/2022 9314 MDM  Number of Diagnoses or Management Options  Diagnosis management comments: Will check labs, EKG, chest x-ray  Disposition pending results  Disposition  Final diagnoses:   Atypical chest pain   Nausea and vomiting     Time reflects when diagnosis was documented in both MDM as applicable and the Disposition within this note     Time User Action Codes Description Comment    4/16/2022  1:44 AM Virgie Messenger P Add [R07 89] Atypical chest pain     4/16/2022  1:44 AM Virgie Messenger P Add [R11 2] Nausea and vomiting       ED Disposition     ED Disposition Condition Date/Time Comment    Discharge Stable Sat Apr 16, 2022  1:44 AM Iron Estefany discharge to home/self care  Follow-up Information     Follow up With Specialties Details Why Contact Info Additional Viivane Núñez 12, DO Internal Medicine Schedule an appointment as soon as possible for a visit   [address]acy [address]  [address] [facility] [place] [address] [facility] Emergency Department Emergency Medicine Go to  If symptoms worsen [place] [id number]  [address] [facility] Emergency Department[address], [address]          Patient's Medications   Discharge Prescriptions    FAMOTIDINE (PEPCID) 20 MG TABLET    Take 1 tablet (20 mg total) by mouth 2 (two) times a day for 15 days       Start Date: 4/16/2022 End Date: 5/1/2022       Order Dose: 20 mg       Quantity: 30 tablet    Refills: 0    ONDANSETRON (ZOFRAN-ODT) 4 MG DISINTEGRATING TABLET    Take 1 tablet (4 mg total) by mouth every 6 (six) hours as needed for nausea       Start Date: 4/16/2022 End Date: --       Order Dose: 4 mg       Quantity: 20 tablet    Refills: 0       No discharge procedures on file      PDMP Review     None          ED Provider  Electronically Signed by           Sheri Cyr MD  04/16/22 9403

## 2022-04-18 LAB
ATRIAL RATE: 87 BPM
ATRIAL RATE: 88 BPM
P AXIS: 51 DEGREES
P AXIS: 52 DEGREES
PR INTERVAL: 162 MS
PR INTERVAL: 170 MS
QRS AXIS: 30 DEGREES
QRS AXIS: 38 DEGREES
QRSD INTERVAL: 80 MS
QRSD INTERVAL: 86 MS
QT INTERVAL: 354 MS
QT INTERVAL: 360 MS
QTC INTERVAL: 425 MS
QTC INTERVAL: 435 MS
T WAVE AXIS: 44 DEGREES
T WAVE AXIS: 46 DEGREES
VENTRICULAR RATE: 87 BPM
VENTRICULAR RATE: 88 BPM

## 2022-04-18 PROCEDURE — 93010 ELECTROCARDIOGRAM REPORT: CPT | Performed by: INTERNAL MEDICINE

## 2022-05-15 ENCOUNTER — HOSPITAL ENCOUNTER (EMERGENCY)
Facility: HOSPITAL | Age: 42
Discharge: HOME | End: 2022-05-15
Attending: EMERGENCY MEDICINE | Admitting: EMERGENCY MEDICINE
Payer: OTHER GOVERNMENT

## 2022-05-15 ENCOUNTER — APPOINTMENT (EMERGENCY)
Dept: CT IMAGING | Facility: HOSPITAL | Age: 42
End: 2022-05-15
Payer: OTHER GOVERNMENT

## 2022-05-15 VITALS
RESPIRATION RATE: 18 BRPM | OXYGEN SATURATION: 98 % | DIASTOLIC BLOOD PRESSURE: 89 MMHG | TEMPERATURE: 98 F | HEART RATE: 89 BPM | HEIGHT: 67 IN | WEIGHT: 200 LBS | BODY MASS INDEX: 31.39 KG/M2 | SYSTOLIC BLOOD PRESSURE: 178 MMHG

## 2022-05-15 DIAGNOSIS — H60.91 RIGHT OTITIS EXTERNA: Primary | ICD-10-CM

## 2022-05-15 PROCEDURE — 96372 THER/PROPH/DIAG INJ SC/IM: CPT

## 2022-05-15 PROCEDURE — 99283 EMERGENCY DEPT VISIT LOW MDM: CPT

## 2022-05-15 PROCEDURE — 70480 CT ORBIT/EAR/FOSSA W/O DYE: CPT

## 2022-05-15 PROCEDURE — 99284 EMERGENCY DEPT VISIT MOD MDM: CPT | Performed by: EMERGENCY MEDICINE

## 2022-05-15 PROCEDURE — G1004 CDSM NDSC: HCPCS

## 2022-05-15 RX ORDER — KETOROLAC TROMETHAMINE 30 MG/ML
30 INJECTION, SOLUTION INTRAMUSCULAR; INTRAVENOUS ONCE
Status: COMPLETED | OUTPATIENT
Start: 2022-05-15 | End: 2022-05-15

## 2022-05-15 RX ORDER — LEVOFLOXACIN 500 MG/1
500 TABLET, FILM COATED ORAL ONCE
Status: COMPLETED | OUTPATIENT
Start: 2022-05-15 | End: 2022-05-15

## 2022-05-15 RX ORDER — ACETAMINOPHEN 325 MG/1
975 TABLET ORAL ONCE
Status: COMPLETED | OUTPATIENT
Start: 2022-05-15 | End: 2022-05-15

## 2022-05-15 RX ORDER — OXYCODONE HYDROCHLORIDE 5 MG/1
5 TABLET ORAL ONCE
Status: COMPLETED | OUTPATIENT
Start: 2022-05-15 | End: 2022-05-15

## 2022-05-15 RX ADMIN — ACETAMINOPHEN 975 MG: 325 TABLET, FILM COATED ORAL at 13:33

## 2022-05-15 RX ADMIN — KETOROLAC TROMETHAMINE 30 MG: 30 INJECTION, SOLUTION INTRAMUSCULAR at 13:35

## 2022-05-15 RX ADMIN — LEVOFLOXACIN 500 MG: 500 TABLET, FILM COATED ORAL at 14:08

## 2022-05-15 RX ADMIN — OXYCODONE HYDROCHLORIDE 5 MG: 5 TABLET ORAL at 13:33

## 2022-05-15 NOTE — ED PROVIDER NOTES
Final Diagnosis:  1  Right otitis externa        Chief Complaint   Patient presents with    Earache     Patient reports one week of left sided ear pain  HPI  Patient presents for evaluation of right-sided ear pain  Patient states that he has been having ear pain for approximately 1 week with some radiation into his right jaw  Denies any fever chills, nausea vomiting, constipation diarrhea  No known sick contacts  Patient states that he was evaluated in provided with some kind of drops that he believes has an antibiotic as well as a steroid in it  He has been using this and is here without any improvement  He states that he has also been started on amoxicillin  Patient states that the pain is worse with palpation of the area  Some radiation to jaw  Better with analgesics  Patient denies any history of ear issues  No change in hearing  Unless otherwise specified:  - No language barrier    - History obtained from patient  - There are no limitations to the history obtained  - Previous charting was reviewed    PMH:   has no past medical history on file  PSH:   has no past surgical history on file  ROS:  Review of Systems   -   - 13 point ROS was performed and all are normal unless stated in the history above  - Nursing note reviewed  Vitals reviewed  - Orders placed by myself and/or advanced practitioner / resident  PE:   Vitals:    05/15/22 1232   BP: (!) 178/89   BP Location: Right arm   Pulse: 89   Resp: 18   Temp: 98 °F (36 7 °C)   TempSrc: Temporal   SpO2: 98%   Weight: 90 7 kg (200 lb)   Height: 5' 7" (1 702 m)     Vitals reviewed by me  Patient has pain with manipulation of right ear  Large amount of debris in right ear canal making observation of tympanic membrane and possible  Consistent with significant otitis externa  Mild tenderness surrounding the ear  No obvious erythema      Unless otherwise specified above:    General: VS reviewed  Appears in NAD    Head: Normocephalic, atraumatic  Eyes: EOM-I  No exudate  ENT: Atraumatic external nose and ears  No malocclusion  No stridor  No drooling  Neck: No JVD  CV: No pallor noted  Lungs:   No tachypnea  No respiratory distress    Abdomen:  Soft, non-tender, non-distended    MSK:   FROM spontaneously  No obvious deformity    Skin: Dry, intact  No obvious rash  Neuro: Awake, alert, GCS15, CN II-XII grossly intact  Speaking in full sentences  Motor grossly intact  Psychiatric/Behavioral: Appropriate mood and affect   Exam: deferred    Physical Exam     Procedures   A:  - Nursing note reviewed  CT orbits/temporal bones/skull base wo contrast   Final Result      Findings consistent with right-sided otitis externa  Right mastoid air cell effusion with extension into the middle ear cavity is also likely infectious or inflammatory in nature  No regional erosive change identified and the ossicular chain is intact  Workstation performed: [id number]           Orders Placed This Encounter   Procedures    CT orbits/temporal bones/skull base wo contrast     Labs Reviewed - No data to display      Final Diagnosis:  1  Right otitis externa        P:  - patient presents for continued ear pain  Given ongoing pain in distribution there was concern for mastoiditis  Will perform CT scan  Based on otitis externa will plan on altering current regimen as well as augmenting with oral antibiotics  -CT scan without obvious signs of mastoiditis  Discussed results with the patient  Will start on oral levofloxacin  Continue ear drops  Should patient not improved he will need follow-up with ENT  I discussed this with the patient as well as other individuals present      Medications   oxyCODONE (ROXICODONE) IR tablet 5 mg (5 mg Oral Given 5/15/22 1333)   acetaminophen (TYLENOL) tablet 975 mg (975 mg Oral Given 5/15/22 1333)   ketorolac (TORADOL) injection 30 mg (30 mg Intramuscular Given 5/15/22 1335)   levofloxacin (LEVAQUIN) tablet 500 mg (500 mg Oral Given 5/15/22 1408)     Time reflects when diagnosis was documented in both MDM as applicable and the Disposition within this note     Time User Action Codes Description Comment    5/15/2022  2:03 PM Cristhian Lewis James [H60 91] Right otitis externa       ED Disposition     ED Disposition   Discharge    Condition   Stable    Date/Time   Sun May 15, 2022  2:03 PM    Dougcristinojama Barbosa discharge to home/self care  Follow-up Information     Follow up With Specialties Details Why Contact Info Additional [id number] [facility] Otolaryngology Schedule an appointment as soon as possible for a visit  If symptoms worsen [address] [facility] [address]  [address] [place] [address], [address] [place] [address], [address] [phone]        Discharge Medication List as of 5/15/2022  2:42 PM      CONTINUE these medications which have NOT CHANGED    Details   famotidine (PEPCID) 20 mg tablet Take 1 tablet (20 mg total) by mouth 2 (two) times a day for 15 days, Starting Sat 4/16/2022, Until Sun 5/1/2022, Print      ibuprofen (MOTRIN) 600 mg tablet Take by mouth every 6 (six) hours as needed for mild pain, Historical Med      ondansetron (ZOFRAN-ODT) 4 mg disintegrating tablet Take 1 tablet (4 mg total) by mouth every 6 (six) hours as needed for nausea, Starting Sat 4/16/2022, Print           No discharge procedures on file  Prior to Admission Medications   Prescriptions Last Dose Informant Patient Reported?  Taking?   famotidine (PEPCID) 20 mg tablet   No No   Sig: Take 1 tablet (20 mg total) by mouth 2 (two) times a day for 15 days   ibuprofen (MOTRIN) 600 mg tablet  Self Yes No   Sig: Take by mouth every 6 (six) hours as needed for mild pain   Patient not taking: Reported on 1/25/2022    ondansetron (ZOFRAN-ODT) 4 mg disintegrating tablet   No No   Sig: Take 1 tablet (4 mg total) by mouth every 6 (six) hours as needed for nausea      Facility-Administered Medications: None       Portions of the record may have been created with voice recognition software  Occasional wrong word or "sound a like" substitutions may have occurred due to the inherent limitations of voice recognition software  Read the chart carefully and recognize, using context, where substitutions have occurred      Electronically signed by:  MD Mj Arthur MD  05/15/22 8071

## 2022-05-15 NOTE — DISCHARGE INSTRUCTIONS
Levaquin 500mg once a day for one week    Ciprodex drops 4 times a day until symptoms resolve  I placed a right sided earwick to improve penetration  This should be removed in 2-3 days and replaced if needed       If symptoms do not improve patient will need to see ENT

## 2022-09-05 ENCOUNTER — HOSPITAL ENCOUNTER (EMERGENCY)
Facility: HOSPITAL | Age: 42
Discharge: HOME | End: 2022-09-05
Attending: EMERGENCY MEDICINE
Payer: COMMERCIAL

## 2022-09-05 ENCOUNTER — APPOINTMENT (OUTPATIENT)
Dept: RADIOLOGY | Facility: HOSPITAL | Age: 42
End: 2022-09-05
Payer: COMMERCIAL

## 2022-09-05 VITALS
HEIGHT: 66 IN | DIASTOLIC BLOOD PRESSURE: 96 MMHG | OXYGEN SATURATION: 97 % | RESPIRATION RATE: 18 BRPM | TEMPERATURE: 98.2 F | SYSTOLIC BLOOD PRESSURE: 158 MMHG | BODY MASS INDEX: 32.95 KG/M2 | HEART RATE: 98 BPM | WEIGHT: 205 LBS

## 2022-09-05 DIAGNOSIS — S50.02XA CONTUSION OF LEFT ELBOW, INITIAL ENCOUNTER: Primary | ICD-10-CM

## 2022-09-05 PROCEDURE — 99284 EMERGENCY DEPT VISIT MOD MDM: CPT | Performed by: EMERGENCY MEDICINE

## 2022-09-05 PROCEDURE — 73080 X-RAY EXAM OF ELBOW: CPT

## 2022-09-05 PROCEDURE — 99283 EMERGENCY DEPT VISIT LOW MDM: CPT

## 2022-09-05 RX ORDER — OXYCODONE HYDROCHLORIDE AND ACETAMINOPHEN 5; 325 MG/1; MG/1
1 TABLET ORAL ONCE
Status: COMPLETED | OUTPATIENT
Start: 2022-09-05 | End: 2022-09-05

## 2022-09-05 RX ORDER — OXYCODONE HYDROCHLORIDE AND ACETAMINOPHEN 5; 325 MG/1; MG/1
1-2 TABLET ORAL EVERY 4 HOURS PRN
Qty: 12 TABLET | Refills: 0 | Status: SHIPPED | OUTPATIENT
Start: 2022-09-05

## 2022-09-05 RX ADMIN — OXYCODONE HYDROCHLORIDE AND ACETAMINOPHEN 1 TABLET: 5; 325 TABLET ORAL at 03:24

## 2022-09-05 NOTE — DISCHARGE INSTRUCTIONS
RETURN IF WORSE IN ANY WAY:   WORSENING PAIN or INCREASED SWELLING,   FEVER OR FLU LIKE SYMPTOMS,   OR NEW AND CONCERNING SYMPTOMS SIGNS OR SYMPTOMS      PLEASE CALL ORTHOPEDIC DOCTOR IN THE MORNING TO SET UP FOLLOW UP   PLEASE REVIEW THE WORK UP RESULTS WITH YOUR DOCTOR    USE EXTREME CAUTION WHILE TAKING NARCOTICS FOR PAIN  ONLY TAKE FOR ACUTE PAIN  NEVER TAKE WITH OTHER SEDATIVE MEDICATIONS OR SUBSTANCES, SUCH AS SLEEPING MEDICATIONS OR ALCOHOL OR OTHER SUCH SUBSTANCES  YOU MAY NEED TO TAKE LAXATIVES WHILE TAKING THIS MEDICATION  PLEASE DISCUSS THE ABOVE WITH YOUR FAMILY DOCTOR

## 2022-09-05 NOTE — ED PROVIDER NOTES
History  Chief Complaint   Patient presents with    Elbow Injury     Pt reports hit in L elbow with jack hammer handle yesterday       45 yo M    PMH:    None      Chief Complaint:   Left elbow pain, struck it on a car jack handle yesterday at home        Mode of Transport:   Sister dropped him off      No other injuries      No other complaints          History provided by:  Patient  Elbow Pain  Location:  Elbow  Elbow location:  L elbow  Pain details:     Quality:  Cramping    Onset quality:  Gradual  Handedness:  Right-handed  Tetanus status:  Up to date  Prior injury to area:  No  Relieved by:  Rest  Worsened by: Movement  Associated symptoms: decreased range of motion (due to pain )    Associated symptoms: no back pain, no fatigue, no fever, no muscle weakness and no neck pain        Prior to Admission Medications   Prescriptions Last Dose Informant Patient Reported? Taking?   famotidine (PEPCID) 20 mg tablet   No No   Sig: Take 1 tablet (20 mg total) by mouth 2 (two) times a day for 15 days   fluticasone (FLONASE) 50 mcg/act nasal spray   Yes No   Si spray into each nostril daily   ibuprofen (MOTRIN) 600 mg tablet  Self Yes No   Sig: Take by mouth every 6 (six) hours as needed for mild pain   Patient not taking: Reported on 2022    ondansetron (ZOFRAN-ODT) 4 mg disintegrating tablet   No No   Sig: Take 1 tablet (4 mg total) by mouth every 6 (six) hours as needed for nausea   Patient not taking: Reported on 2022   predniSONE 20 mg tablet   No No   Sig: 3 tablets by mouth with food in AM x 3 days, 2 tabs by mouth with food in AM x 3 days then 1 tab with food each morning x 3 days   Patient not taking: Reported on 2022      Facility-Administered Medications: None       History reviewed  No pertinent past medical history  History reviewed  No pertinent surgical history      Family History   Problem Relation Age of Onset    No Known Problems Mother     No Known Problems Father      I have reviewed and agree with the history as documented  E-Cigarette/Vaping    E-Cigarette Use Never User      E-Cigarette/Vaping Substances     Social History     Tobacco Use    Smoking status: Current Every Day Smoker     Packs/day: 1 00    Smokeless tobacco: Never Used   Vaping Use    Vaping Use: Never used   Substance Use Topics    Alcohol use: Never    Drug use: Never       Review of Systems   Constitutional: Negative for chills, diaphoresis, fatigue and fever  Respiratory: Negative for cough, shortness of breath, wheezing and stridor  Cardiovascular: Negative for chest pain, palpitations and leg swelling  Gastrointestinal: Negative for abdominal pain, blood in stool, nausea and vomiting  Genitourinary: Negative for difficulty urinating, dysuria, flank pain and frequency  Musculoskeletal: Negative for arthralgias, back pain, gait problem, joint swelling, myalgias, neck pain and neck stiffness  Left elbow pain    Skin: Negative for rash and wound  Neurological: Negative for dizziness, light-headedness and headaches  All other systems reviewed and are negative  Physical Exam  Physical Exam  Constitutional:       General: He is not in acute distress  Appearance: He is well-developed  He is not ill-appearing, toxic-appearing or diaphoretic  HENT:      Head: Normocephalic and atraumatic  Right Ear: External ear normal       Left Ear: External ear normal       Nose: Nose normal    Eyes:      General: No scleral icterus  Right eye: No discharge  Left eye: No discharge  Extraocular Movements: Extraocular movements intact  Conjunctiva/sclera: Conjunctivae normal       Pupils: Pupils are equal, round, and reactive to light  Neck:      Vascular: No JVD  Trachea: No tracheal deviation  Cardiovascular:      Rate and Rhythm: Normal rate and regular rhythm  Heart sounds: Normal heart sounds  No murmur heard  No friction rub  No gallop  Pulmonary:      Effort: Pulmonary effort is normal  No respiratory distress  Breath sounds: Normal breath sounds  No stridor  No wheezing, rhonchi or rales  Chest:      Chest wall: No tenderness  Abdominal:      General: Bowel sounds are normal  There is no distension  Palpations: Abdomen is soft  There is no mass  Tenderness: There is no abdominal tenderness  There is no right CVA tenderness, left CVA tenderness, guarding or rebound  Hernia: No hernia is present  Musculoskeletal:         General: Tenderness (let lateral elbow) present  No swelling or deformity  Normal range of motion  Cervical back: Normal range of motion and neck supple  No rigidity or tenderness  Comments: NVI  Compartments soft    Lymphadenopathy:      Cervical: No cervical adenopathy  Skin:     General: Skin is warm  Capillary Refill: Capillary refill takes less than 2 seconds  Coloration: Skin is not jaundiced or pale  Findings: No bruising, erythema, lesion or rash  Neurological:      General: No focal deficit present  Mental Status: He is alert and oriented to person, place, and time  Cranial Nerves: No cranial nerve deficit  Sensory: No sensory deficit  Motor: No weakness or abnormal muscle tone  Coordination: Coordination normal    Psychiatric:         Mood and Affect: Mood normal          Behavior: Behavior normal          Thought Content:  Thought content normal          Judgment: Judgment normal          Vital Signs  ED Triage Vitals [09/05/22 0249]   Temperature Pulse Respirations Blood Pressure SpO2   98 2 °F (36 8 °C) (!) 112 18 (!) 173/105 97 %      Temp Source Heart Rate Source Patient Position - Orthostatic VS BP Location FiO2 (%)   Tympanic Monitor Sitting Right arm --      Pain Score       6           Vitals:    09/05/22 0249   BP: (!) 173/105   Pulse: (!) 112   Patient Position - Orthostatic VS: Sitting         Visual Acuity      ED Medications  Medications - No data to display    Diagnostic Studies  Results Reviewed     None                 No orders to display              Procedures  Procedures         ED Course  ED Course as of 09/05/22 0605   [facility] Sep 05, 2022   6900 Patient reassessed  He is improved    No fx seen   I gave pt return precautions  He is happy w/ plan of DC                                SBIRT 20yo+    Flowsheet Row Most Recent Value   SBIRT (23 yo +)    In order to provide better care to our patients, we are screening all of our patients for alcohol and drug use  Would it be okay to ask you these screening questions? Yes Filed at: 09/05/2022 0251   Initial Alcohol Screen: US AUDIT-C     1  How often do you have a drink containing alcohol? 0 Filed at: 09/05/2022 0251   2  How many drinks containing alcohol do you have on a typical day you are drinking? 0 Filed at: 09/05/2022 0251   3a  Male UNDER 65: How often do you have five or more drinks on one occasion? 0 Filed at: 09/05/2022 0251   3b  FEMALE Any Age, or MALE 65+: How often do you have 4 or more drinks on one occassion? 0 Filed at: 09/05/2022 0251   Audit-C Score 0 Filed at: 09/05/2022 2557   HETAL: How many times in the past year have you    Used an illegal drug or used a prescription medication for non-medical reasons? Never Filed at: 09/05/2022 0251                    MDM    Disposition  Final diagnoses:   None     ED Disposition     None      Follow-up Information    None         Patient's Medications   Discharge Prescriptions    No medications on file       No discharge procedures on file      PDMP Review     None          ED Provider  Electronically Signed by           Primo Cameron MD  09/05/22 5873